# Patient Record
Sex: FEMALE | Race: WHITE | NOT HISPANIC OR LATINO | Employment: UNEMPLOYED
[De-identification: names, ages, dates, MRNs, and addresses within clinical notes are randomized per-mention and may not be internally consistent; named-entity substitution may affect disease eponyms.]

---

## 2017-09-03 ENCOUNTER — HEALTH MAINTENANCE LETTER (OUTPATIENT)
Age: 22
End: 2017-09-03

## 2021-09-02 ENCOUNTER — HOSPITAL ENCOUNTER (EMERGENCY)
Facility: CLINIC | Age: 26
Discharge: HOME | End: 2021-09-03
Attending: EMERGENCY MEDICINE | Admitting: EMERGENCY MEDICINE
Payer: COMMERCIAL

## 2021-09-02 ENCOUNTER — APPOINTMENT (OUTPATIENT)
Dept: CT IMAGING | Facility: CLINIC | Age: 26
End: 2021-09-02
Attending: EMERGENCY MEDICINE
Payer: COMMERCIAL

## 2021-09-02 VITALS
HEIGHT: 68 IN | DIASTOLIC BLOOD PRESSURE: 65 MMHG | OXYGEN SATURATION: 98 % | BODY MASS INDEX: 26.52 KG/M2 | RESPIRATION RATE: 18 BRPM | SYSTOLIC BLOOD PRESSURE: 106 MMHG | HEART RATE: 67 BPM | TEMPERATURE: 97.8 F | WEIGHT: 175 LBS

## 2021-09-02 DIAGNOSIS — M99.51 INTERVERTEBRAL DISC STENOSIS OF NEURAL CANAL OF CERVICAL REGION: ICD-10-CM

## 2021-09-02 DIAGNOSIS — E04.1 THYROID NODULE: ICD-10-CM

## 2021-09-02 DIAGNOSIS — S16.1XXA STRAIN OF NECK MUSCLE, INITIAL ENCOUNTER: ICD-10-CM

## 2021-09-02 PROCEDURE — 250N000011 HC RX IP 250 OP 636: Performed by: EMERGENCY MEDICINE

## 2021-09-02 PROCEDURE — 99285 EMERGENCY DEPT VISIT HI MDM: CPT | Mod: 25 | Performed by: EMERGENCY MEDICINE

## 2021-09-02 PROCEDURE — 96374 THER/PROPH/DIAG INJ IV PUSH: CPT | Performed by: EMERGENCY MEDICINE

## 2021-09-02 PROCEDURE — 72125 CT NECK SPINE W/O DYE: CPT

## 2021-09-02 PROCEDURE — 96375 TX/PRO/DX INJ NEW DRUG ADDON: CPT | Performed by: EMERGENCY MEDICINE

## 2021-09-02 PROCEDURE — 99285 EMERGENCY DEPT VISIT HI MDM: CPT | Performed by: EMERGENCY MEDICINE

## 2021-09-02 RX ORDER — HYDROMORPHONE HYDROCHLORIDE 1 MG/ML
0.3 INJECTION, SOLUTION INTRAMUSCULAR; INTRAVENOUS; SUBCUTANEOUS ONCE
Status: COMPLETED | OUTPATIENT
Start: 2021-09-02 | End: 2021-09-02

## 2021-09-02 RX ORDER — METHYLPREDNISOLONE 4 MG
TABLET, DOSE PACK ORAL
Qty: 21 TABLET | Refills: 0 | Status: SHIPPED | OUTPATIENT
Start: 2021-09-02

## 2021-09-02 RX ORDER — DIAZEPAM 10 MG/2ML
2.5 INJECTION, SOLUTION INTRAMUSCULAR; INTRAVENOUS ONCE
Status: COMPLETED | OUTPATIENT
Start: 2021-09-02 | End: 2021-09-02

## 2021-09-02 RX ORDER — OXYCODONE AND ACETAMINOPHEN 5; 325 MG/1; MG/1
1 TABLET ORAL EVERY 6 HOURS PRN
Qty: 6 TABLET | Refills: 0 | Status: SHIPPED | OUTPATIENT
Start: 2021-09-02

## 2021-09-02 RX ORDER — DIAZEPAM 10 MG/2ML
5 INJECTION, SOLUTION INTRAMUSCULAR; INTRAVENOUS ONCE
Status: DISCONTINUED | OUTPATIENT
Start: 2021-09-02 | End: 2021-09-03 | Stop reason: HOSPADM

## 2021-09-02 RX ORDER — KETOROLAC TROMETHAMINE 15 MG/ML
15 INJECTION, SOLUTION INTRAMUSCULAR; INTRAVENOUS ONCE
Status: DISCONTINUED | OUTPATIENT
Start: 2021-09-02 | End: 2021-09-03 | Stop reason: HOSPADM

## 2021-09-02 RX ADMIN — HYDROMORPHONE HYDROCHLORIDE 0.3 MG: 1 INJECTION, SOLUTION INTRAMUSCULAR; INTRAVENOUS; SUBCUTANEOUS at 21:20

## 2021-09-02 RX ADMIN — DIAZEPAM 2.5 MG: 5 INJECTION, SOLUTION INTRAMUSCULAR; INTRAVENOUS at 22:50

## 2021-09-02 ASSESSMENT — MIFFLIN-ST. JEOR: SCORE: 1582.29

## 2021-09-03 NOTE — DISCHARGE INSTRUCTIONS
[facility]  [address], [place] [address]  Return if symptoms worsen or new symptoms develop.  Wear soft collar for comfort.  Take Medrol Dosepak and pain medication as directed.  Follow-up with Dr. Franco in the [facility] clinic at the address above.  He will be there from 7-11 please follow-up at his clinic and they will get you into the clinic you just need to go to the  and explain that you are supposed to be seen.  You also should follow-up with your primary care and have a TSH level drawn and also have a thyroid ultrasound set up to further assess the thyroid nodule seen on CT scan.  If any severe pain numbness weakness occur please return for immediate evaluation.

## 2021-09-03 NOTE — ED TRIAGE NOTES
[place] and felt here neck vertebrae shift. Pt having sever neck pain at this time.

## 2021-09-03 NOTE — ED PROVIDER NOTES
History     Chief Complaint   Patient presents with     Neck Pain     HPI  Anna Zimmer is a 25 year old female with a past medical history significant for hypothyroidism irritable bowel syndrome bipolar affective disorder and depression who presents the emergency department complaining of neck pain.  Patient was [place] today and was [place] a roller coaster she closed her eyes and when it whipped around the corner her neck snapped to the left and she has been having pain in her neck since that time.  The pain she rates a 7 out of 10 but will significantly worsen with movement.  She denies any numbness or weakness in her arms.  She has pain with movement of the neck.  She denies any chest pain or shortness of breath she has not had any abdominal pain or back pain she denies any focal numbness weakness in extremity she has not had any bowel or bladder dysfunction.    Allergies:  Allergies   Allergen Reactions     Cefzil [Cefprozil] Rash     Ibuprofen Hives     Sulfa Drugs        Problem List:    Patient Active Problem List    Diagnosis Date Noted     Syncope and collapse 05/24/2013     Priority: Medium     Distal radius fracture 07/17/2012     Priority: Medium     Hypothyroidism 06/22/2011     Priority: Medium     IBS (irritable bowel syndrome) 04/27/2011     Priority: Medium     Bipolar affective disorder (H) 12/20/2010     Priority: Medium     Psychiatrist- Dr Gail Vences Wasta       Moderate major depression (H) 01/05/2010     Priority: Medium     Dysmenorrhea 10/02/2007     Priority: Medium        Past Medical History:    No past medical history on file.    Past Surgical History:    Past Surgical History:   Procedure Laterality Date     PE TUBES  1995    tubes in ears [id number]       Family History:    Family History   Problem Relation Age of Onset     Diabetes Maternal Grandmother      C.A.D. Maternal Grandmother      Heart Disease Maternal Grandfather         angioplasty     Cancer Maternal  "Grandfather         lung cancer     Neurologic Disorder Mother      Allergies Mother         as a child     Neurologic Disorder Father         Behcets syndrome     Hypertension Father      Allergies Father      Diabetes Father      Asthma No family hx of      C.A.D. No family hx of      Cerebrovascular Disease No family hx of      Breast Cancer No family hx of      Cancer - colorectal No family hx of      Prostate Cancer No family hx of        Social History:  Marital Status:   [2]  Social History     Tobacco Use     Smoking status: Never Smoker     Smokeless tobacco: Never Used   Substance Use Topics     Alcohol use: No     Drug use: No        Medications:    ACETAMINOPHEN  albuterol (PROVENTIL HFA: VENTOLIN HFA) 108 (90 BASE) MCG/ACT inhaler  BENADRYL ALLERGY PO  fexofenadine (ALLEGRA ALLERGY) 180 MG tablet  fluticasone (FLONASE) 50 MCG/ACT nasal spray  ibuprofen (ADVIL,MOTRIN) 800 MG tablet  LamoTRIgine (LAMICTAL XR) 25 MG TB24  levonorgestrel-ethinyl estradiol (AVIANE,ALESSE,LESSINA) 0.1-20 MG-MCG per tablet  levothyroxine (SYNTHROID, LEVOTHROID) 112 MCG tablet  Olopatadine HCl (PATADAY) 0.2 % SOLN  predniSONE (DELTASONE) 20 MG tablet  QUEtiapine (SEROQUEL) 200 MG tablet  quetiapine (SEROQUEL) 25 MG tablet  TRAZodone (DESYREL) 50 MG tablet          Review of Systems  All systems reviewed and other than pertinent positives and negatives in HPI all other systems are negative.  Physical Exam   BP: 118/79  Pulse: 65  Temp: 97.8  F (36.6  C)  Resp: 18  Height: 172.7 cm (5' 8\")  Weight: 79.4 kg (175 lb)  SpO2: 100 %      Physical Exam  Constitutional:       Appearance: Normal appearance.   HENT:      Head: Normocephalic and atraumatic.      Nose: Nose normal.   Eyes:      Conjunctiva/sclera: Conjunctivae normal.   Neck:      Comments: Tender to palpation of the posterior lateral aspect of the neck especially in the lower neck region.  There is no obvious significant neck tenderness midline but significant " tenderness along the posterior lateral aspect with spasming and some significant pain with palpation of the trapezius muscle left and laterally along the neck.  Trachea midline no erythema or swelling is noted.  Cardiovascular:      Rate and Rhythm: Normal rate and regular rhythm.      Heart sounds: Normal heart sounds.   Pulmonary:      Effort: Pulmonary effort is normal.      Breath sounds: Normal breath sounds. No wheezing or rhonchi.   Chest:      Chest wall: No tenderness.   Musculoskeletal:         General: No swelling or tenderness. Normal range of motion.      Right lower leg: No edema.      Left lower leg: No edema.   Skin:     General: Skin is warm and dry.      Capillary Refill: Capillary refill takes less than 2 seconds.      Findings: No rash.   Neurological:      General: No focal deficit present.      Mental Status: She is alert and oriented to person, place, and time.      Motor: No weakness.      Coordination: Coordination normal.      Deep Tendon Reflexes: Reflexes normal.   Psychiatric:         Mood and Affect: Mood normal.         ED Course        Procedures              Critical Care time:  none               Results for orders placed or performed during the hospital encounter of 09/02/21 (from the past 24 hour(s))   Cervical spine CT w/o contrast    Narrative    EXAM: CT CERVICAL SPINE W/O CONTRAST  LOCATION: [facility]  DATE/TIME: 9/2/2021 9:44 PM    INDICATION: Neck trauma, impaired ROM (Age 16-64y)  COMPARISON: None.  TECHNIQUE: Routine CT Cervical Spine without IV contrast. Multiplanar reformats. Dose reduction techniques were used.    FINDINGS:  VERTEBRA: Normal vertebral body heights. Mild levoconvex curvature of the cervical spine. Reversal the normal cervical lordosis. No fracture or posttraumatic subluxation.     CANAL/FORAMINA: Left of midline focal disc protrusion with effacement and contact of the left ventral spinal cord. There is mild associated central  spinal canal stenosis. No high-grade neural foraminal stenosis.    PARASPINAL: Heterogeneous appearance of the thyroid. There is apparent hyperdense nodule within the left lobe measuring up to 2.1 cm.      Impression    IMPRESSION:  1.  No fracture or posttraumatic subluxation.  2.  Left of midline focal disc protrusion with effacement and contact of the ventral spinal cord at C4-C5. There is mild associated central spinal canal stenosis.  3.  Heterogeneous appearance of the thyroid. Apparent hyperdense nodule within the left thyroid measuring 2.1 cm. Recommend non-emergent thyroid function testing and thyroid ultrasound for further assessment.         Medications   ketorolac (TORADOL) injection 15 mg (15 mg Intravenous Not Given 9/2/21 2120)   diazepam (VALIUM) injection 5 mg (5 mg Intravenous Not Given 9/2/21 2121)   HYDROmorphone (PF) (DILAUDID) injection 0.3 mg (0.3 mg Intravenous Given 9/2/21 2120)   diazepam (VALIUM) injection 2.5 mg (2.5 mg Intravenous Given 9/2/21 2250)       Assessments & Plan (with Medical Decision Making) records were reviewed.  Patient was given Dilaudid 0.3 mg and Valium 2.5 mg for pain.  A CT scan of the cervical spine was obtained.  This revealed no fracture posttraumatic subluxation left of midline there is a focal disc protrusion with effacement and contact of the ventral spinal cord at C4-C5 there is mild associated central spinal canal stenosis.  There was also incidentally noted heterogenous appearance of thyroid.  Hyperdense nodule within the left thyroid measuring 2.1 cm a outpatient nonemergent thyroid function test and thyroid ultrasound for further assessment is recommended.  Patient is feeling better with medications but still has fairly significant.  Discussed case with Dr. Franco spine physician with [facility].  With patient having no numbness or weakness in her extremities she did did not feel emergent care was needed he recommended a soft collar on the  neck with Medrol Dosepak and pain medication as needed he will see the patient in his clinic tomorrow between 730 and 11 AM at the [facility] orthopedics clinic.  I advised patient of these findings and she is in agreement this plan.  She understands that if any worsening pain numbness weakness or other symptoms present she should return for further evaluation and care.  He plans to set the patient up for an MRI in the morning.     I have reviewed the nursing notes.    I have reviewed the findings, diagnosis, plan and need for follow up with the patient.       Discharge Medication List as of 9/2/2021 11:51 PM      START taking these medications    Details   methylPREDNISolone (MEDROL DOSEPAK) 4 MG tablet therapy pack Follow package directions, Disp-21 tablet, R-0, [facility]      oxyCODONE-acetaminophen (PERCOCET) 5-325 MG tablet Take 1 tablet by mouth every 6 hours as needed for severe pain, Disp-6 tablet, R-0, [facility]             Final diagnoses:   Strain of neck muscle, initial encounter   Intervertebral disc stenosis of neural canal of cervical region - L C4-C5   Thyroid nodule       9/2/2021   [facility] EMERGENCY DEPT     Drage, Leonides Silva MD  09/04/21 [id number]

## 2021-09-03 NOTE — ED NOTES
Aspin collar applied with assist of 2.  Pt tolerated well.  Pt education done on care of collar and pads.   Pt is ready to discharge to [facility] [place] with f/u care.

## 2024-11-18 ENCOUNTER — TRANSCRIBE ORDERS (OUTPATIENT)
Dept: OTHER | Age: 29
End: 2024-11-18

## 2024-11-18 DIAGNOSIS — R79.89 ELEVATED TSH: Primary | ICD-10-CM

## 2024-11-18 DIAGNOSIS — Z86.39 HISTORY OF HYPOTHYROIDISM: ICD-10-CM
